# Patient Record
Sex: FEMALE | Race: WHITE | NOT HISPANIC OR LATINO
[De-identification: names, ages, dates, MRNs, and addresses within clinical notes are randomized per-mention and may not be internally consistent; named-entity substitution may affect disease eponyms.]

---

## 2021-08-19 ENCOUNTER — APPOINTMENT (OUTPATIENT)
Dept: OPHTHALMOLOGY | Facility: CLINIC | Age: 21
End: 2021-08-19
Payer: COMMERCIAL

## 2021-08-19 ENCOUNTER — NON-APPOINTMENT (OUTPATIENT)
Age: 21
End: 2021-08-19

## 2021-08-19 PROCEDURE — 92002 INTRM OPH EXAM NEW PATIENT: CPT

## 2021-08-26 ENCOUNTER — RESULT CHARGE (OUTPATIENT)
Age: 21
End: 2021-08-26

## 2021-08-27 ENCOUNTER — APPOINTMENT (OUTPATIENT)
Dept: OBGYN | Facility: CLINIC | Age: 21
End: 2021-08-27
Payer: COMMERCIAL

## 2021-08-27 VITALS
WEIGHT: 262 LBS | HEIGHT: 66 IN | SYSTOLIC BLOOD PRESSURE: 124 MMHG | BODY MASS INDEX: 42.11 KG/M2 | DIASTOLIC BLOOD PRESSURE: 72 MMHG

## 2021-08-27 DIAGNOSIS — Z80.3 FAMILY HISTORY OF MALIGNANT NEOPLASM OF BREAST: ICD-10-CM

## 2021-08-27 DIAGNOSIS — N92.6 IRREGULAR MENSTRUATION, UNSPECIFIED: ICD-10-CM

## 2021-08-27 DIAGNOSIS — Z80.8 FAMILY HISTORY OF MALIGNANT NEOPLASM OF OTHER ORGANS OR SYSTEMS: ICD-10-CM

## 2021-08-27 DIAGNOSIS — Z78.9 OTHER SPECIFIED HEALTH STATUS: ICD-10-CM

## 2021-08-27 DIAGNOSIS — Z13.79 ENCOUNTER FOR OTHER SCREENING FOR GENETIC AND CHROMOSOMAL ANOMALIES: ICD-10-CM

## 2021-08-27 DIAGNOSIS — Z30.42 ENCOUNTER FOR SURVEILLANCE OF INJECTABLE CONTRACEPTIVE: ICD-10-CM

## 2021-08-27 DIAGNOSIS — Z82.49 FAMILY HISTORY OF ISCHEMIC HEART DISEASE AND OTHER DISEASES OF THE CIRCULATORY SYSTEM: ICD-10-CM

## 2021-08-27 DIAGNOSIS — Z83.3 FAMILY HISTORY OF DIABETES MELLITUS: ICD-10-CM

## 2021-08-27 PROCEDURE — 99202 OFFICE O/P NEW SF 15 MIN: CPT | Mod: 25

## 2021-08-27 PROCEDURE — 96372 THER/PROPH/DIAG INJ SC/IM: CPT

## 2021-08-27 PROCEDURE — 36415 COLL VENOUS BLD VENIPUNCTURE: CPT

## 2021-08-27 RX ORDER — MEDROXYPROGESTERONE ACETATE 150 MG/ML
150 INJECTION, SUSPENSION INTRAMUSCULAR
Qty: 1 | Refills: 3 | Status: ACTIVE | COMMUNITY
Start: 2021-08-27 | End: 1900-01-01

## 2021-08-27 RX ORDER — METHYLPREDNISOLONE ACETATE 40 MG/ML
40 INJECTION, SUSPENSION INTRA-ARTICULAR; INTRALESIONAL; INTRAMUSCULAR; SOFT TISSUE
Refills: 0 | Status: ACTIVE | COMMUNITY

## 2021-08-27 RX ORDER — MEDROXYPROGESTERONE ACETATE 150 MG/ML
150 INJECTION, SUSPENSION INTRAMUSCULAR
Refills: 0 | Status: COMPLETED | OUTPATIENT
Start: 2021-08-27

## 2021-08-27 RX ADMIN — MEDROXYPROGESTERONE ACETATE 0 MG/ML: 150 INJECTION, SUSPENSION INTRAMUSCULAR at 00:00

## 2021-08-27 NOTE — DISCUSSION/SUMMARY
[FreeTextEntry1] : Depo Provera IM in left deltoid \par tolerated well\par \par Lot# [id number]\par EXP: 2/28/2025\par \par Hormone panel performed\par REferral for genetics \par \par Pt will RTO in 1/22 for annual

## 2021-08-27 NOTE — HISTORY OF PRESENT ILLNESS
[Y] : Patient is sexually active [N] : Patient denies prior pregnancies [Menarche Age: ____] : age at menarche was [unfilled] [Currently Active] : currently active [LMP unknown] : LMP unknown [unknown] : Patient is unsure of the date of her LMP [FreeTextEntry1] : Marielos is 21 year old G0, LMP, pt is on Depo.  Pt presents to [facility] care. Her last annual was 1/2021 (normal as per pt).  She denies any breast issues or urinary symptoms. She denies any vaginal issues or pelvic pain. She reports normal bowel movements. She is currently sexually active with one male partner with no issues.\par \par \par Pt got her first menses at 16. Pt was put on progesterone. Pt reports that she started OCPS at age 16. Pt has been on Depo or 2 yrs.  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

## 2021-08-28 LAB
ESTRADIOL SERPL-MCNC: 36 PG/ML
FSH SERPL-MCNC: 5.6 IU/L
LH SERPL-ACNC: 7 IU/L
PROLACTIN SERPL-MCNC: 14 NG/ML
T3FREE SERPL-MCNC: 3.11 PG/ML
T4 FREE SERPL-MCNC: 1.2 NG/DL
TSH SERPL-ACNC: 3.53 UIU/ML

## 2021-08-31 LAB
TESTOST BND SERPL-MCNC: 1 PG/ML
TESTOSTERONE TOTAL S: 21 NG/DL

## 2021-09-01 ENCOUNTER — APPOINTMENT (OUTPATIENT)
Dept: MATERNAL FETAL MEDICINE | Facility: CLINIC | Age: 21
End: 2021-09-01
Payer: COMMERCIAL

## 2021-09-01 ENCOUNTER — ASOB RESULT (OUTPATIENT)
Age: 21
End: 2021-09-01

## 2021-09-01 PROCEDURE — 99212 OFFICE O/P EST SF 10 MIN: CPT | Mod: 95

## 2021-09-16 LAB
HCG UR QL: NEGATIVE
QUALITY CONTROL: YES

## 2021-11-08 ENCOUNTER — TRANSCRIPTION ENCOUNTER (OUTPATIENT)
Age: 21
End: 2021-11-08

## 2021-11-11 ENCOUNTER — RESULT REVIEW (OUTPATIENT)
Age: 21
End: 2021-11-11

## 2021-11-12 ENCOUNTER — APPOINTMENT (OUTPATIENT)
Dept: OBGYN | Facility: CLINIC | Age: 21
End: 2021-11-12

## 2021-12-08 ENCOUNTER — RESULT REVIEW (OUTPATIENT)
Age: 21
End: 2021-12-08

## 2022-09-01 ENCOUNTER — APPOINTMENT (OUTPATIENT)
Dept: OPHTHALMOLOGY | Facility: CLINIC | Age: 22
End: 2022-09-01

## 2022-09-01 ENCOUNTER — OFFICE (OUTPATIENT)
Dept: URBAN - METROPOLITAN AREA CLINIC 113 | Facility: CLINIC | Age: 22
Setting detail: OPHTHALMOLOGY
End: 2022-09-01
Payer: MEDICAID

## 2022-09-01 DIAGNOSIS — H04.123: ICD-10-CM

## 2022-09-01 PROCEDURE — 92004 COMPRE OPH EXAM NEW PT 1/>: CPT | Performed by: OPHTHALMOLOGY

## 2022-09-01 ASSESSMENT — REFRACTION_MANIFEST
OS_CYLINDER: -2.50
OD_VA1: 20/20
OS_VA1: 20/20
OS_AXIS: 081
OS_SPHERE: -1.75
OD_AXIS: 104
OD_SPHERE: -1.25
OD_CYLINDER: -1.25

## 2022-09-01 ASSESSMENT — TONOMETRY
OD_IOP_MMHG: 15
OS_IOP_MMHG: 15

## 2022-09-01 ASSESSMENT — KERATOMETRY
OD_K1POWER_DIOPTERS: 43.25
OS_AXISANGLE_DEGREES: 144
OS_K1POWER_DIOPTERS: 43.75
OD_AXISANGLE_DEGREES: 078
OD_K2POWER_DIOPTERS: 44.00
OS_K2POWER_DIOPTERS: 44.75

## 2022-09-01 ASSESSMENT — REFRACTION_AUTOREFRACTION
OS_AXIS: 081
OS_SPHERE: -1.50
OD_CYLINDER: -1.00
OD_AXIS: 104
OD_SPHERE: -1.00
OS_CYLINDER: -2.00

## 2022-09-01 ASSESSMENT — SPHEQUIV_DERIVED
OD_SPHEQUIV: -1.875
OS_SPHEQUIV: -3
OS_SPHEQUIV: -2.5
OD_SPHEQUIV: -1.5

## 2022-09-01 ASSESSMENT — REFRACTION_CURRENTRX
OS_AXIS: 081
OD_OVR_VA: 20/
OS_OVR_VA: 20/
OD_CYLINDER: -0.75
OD_AXIS: 110
OS_SPHERE: -1.00
OS_CYLINDER: -1.75
OD_SPHERE: -0.50

## 2022-09-01 ASSESSMENT — AXIALLENGTH_DERIVED
OS_AL: 24.5204
OS_AL: 24.3117
OD_AL: 24.143
OD_AL: 24.297

## 2022-09-01 ASSESSMENT — VISUAL ACUITY
OS_BCVA: 20/25
OD_BCVA: 20/25-

## 2022-09-01 ASSESSMENT — CONFRONTATIONAL VISUAL FIELD TEST (CVF)
OD_FINDINGS: FULL
OS_FINDINGS: FULL

## 2022-09-01 ASSESSMENT — SUPERFICIAL PUNCTATE KERATITIS (SPK)
OD_SPK: 1+
OS_SPK: 1+

## 2022-11-06 ENCOUNTER — NON-APPOINTMENT (OUTPATIENT)
Age: 22
End: 2022-11-06

## 2023-09-16 ENCOUNTER — NON-APPOINTMENT (OUTPATIENT)
Age: 23
End: 2023-09-16

## 2025-01-23 ENCOUNTER — NON-APPOINTMENT (OUTPATIENT)
Age: 25
End: 2025-01-23

## 2025-08-24 ENCOUNTER — NON-APPOINTMENT (OUTPATIENT)
Age: 25
End: 2025-08-24